# Patient Record
Sex: FEMALE | Race: WHITE | NOT HISPANIC OR LATINO | Employment: OTHER | URBAN - METROPOLITAN AREA
[De-identification: names, ages, dates, MRNs, and addresses within clinical notes are randomized per-mention and may not be internally consistent; named-entity substitution may affect disease eponyms.]

---

## 2019-03-28 ENCOUNTER — HISTORICAL (OUTPATIENT)
Dept: ADMINISTRATIVE | Facility: HOSPITAL | Age: 59
End: 2019-03-28

## 2019-03-28 LAB
ABS NEUT (OLG): 3.63 X10(3)/MCL (ref 2.1–9.2)
ALBUMIN SERPL-MCNC: 4.2 GM/DL (ref 3.4–5)
ALBUMIN/GLOB SERPL: 1.4 {RATIO}
ALP SERPL-CCNC: 106 UNIT/L (ref 38–126)
ALT SERPL-CCNC: 38 UNIT/L (ref 12–78)
APPEARANCE, UA: CLEAR
AST SERPL-CCNC: 18 UNIT/L (ref 15–37)
BACTERIA SPEC CULT: ABNORMAL /HPF
BASOPHILS # BLD AUTO: 0 X10(3)/MCL (ref 0–0.2)
BASOPHILS NFR BLD AUTO: 0 %
BILIRUB SERPL-MCNC: 0.9 MG/DL (ref 0.2–1)
BILIRUB UR QL STRIP: NEGATIVE
BILIRUBIN DIRECT+TOT PNL SERPL-MCNC: 0.2 MG/DL (ref 0–0.2)
BILIRUBIN DIRECT+TOT PNL SERPL-MCNC: 0.7 MG/DL (ref 0–0.8)
BUN SERPL-MCNC: 20 MG/DL (ref 7–18)
CALCIUM SERPL-MCNC: 9.4 MG/DL (ref 8.5–10.1)
CHLORIDE SERPL-SCNC: 104 MMOL/L (ref 98–107)
CHOLEST SERPL-MCNC: 139 MG/DL (ref 0–200)
CHOLEST/HDLC SERPL: 3 {RATIO} (ref 0–4)
CO2 SERPL-SCNC: 31 MMOL/L (ref 21–32)
COLOR UR: YELLOW
CREAT SERPL-MCNC: 0.86 MG/DL (ref 0.55–1.02)
EOSINOPHIL # BLD AUTO: 0.1 X10(3)/MCL (ref 0–0.9)
EOSINOPHIL NFR BLD AUTO: 2 %
ERYTHROCYTE [DISTWIDTH] IN BLOOD BY AUTOMATED COUNT: 12.9 % (ref 11.5–17)
GLOBULIN SER-MCNC: 2.9 GM/DL (ref 2.4–3.5)
GLUCOSE (UA): NEGATIVE
GLUCOSE SERPL-MCNC: 114 MG/DL (ref 74–106)
HCT VFR BLD AUTO: 45 % (ref 37–47)
HDLC SERPL-MCNC: 46 MG/DL (ref 35–60)
HGB BLD-MCNC: 14.6 GM/DL (ref 12–16)
HGB UR QL STRIP: NEGATIVE
KETONES UR QL STRIP: NEGATIVE
LDLC SERPL CALC-MCNC: 74 MG/DL (ref 0–129)
LEUKOCYTE ESTERASE UR QL STRIP: NEGATIVE
LYMPHOCYTES # BLD AUTO: 2.5 X10(3)/MCL (ref 0.6–4.6)
LYMPHOCYTES NFR BLD AUTO: 37 %
MCH RBC QN AUTO: 30.9 PG (ref 27–31)
MCHC RBC AUTO-ENTMCNC: 32.4 GM/DL (ref 33–36)
MCV RBC AUTO: 95.1 FL (ref 80–94)
MONOCYTES # BLD AUTO: 0.4 X10(3)/MCL (ref 0.1–1.3)
MONOCYTES NFR BLD AUTO: 7 %
NEUTROPHILS # BLD AUTO: 3.63 X10(3)/MCL (ref 2.1–9.2)
NEUTROPHILS NFR BLD AUTO: 54 %
NITRITE UR QL STRIP: NEGATIVE
PH UR STRIP: 5 [PH] (ref 5–9)
PLATELET # BLD AUTO: 317 X10(3)/MCL (ref 130–400)
PMV BLD AUTO: 9 FL (ref 9.4–12.4)
POTASSIUM SERPL-SCNC: 4.2 MMOL/L (ref 3.5–5.1)
PROT SERPL-MCNC: 7.1 GM/DL (ref 6.4–8.2)
PROT UR QL STRIP: NEGATIVE
RBC # BLD AUTO: 4.73 X10(6)/MCL (ref 4.2–5.4)
RBC #/AREA URNS HPF: 5 /HPF (ref 0–2)
SODIUM SERPL-SCNC: 141 MMOL/L (ref 136–145)
SP GR UR STRIP: 1.02 (ref 1–1.03)
SQUAMOUS EPITHELIAL, UA: ABNORMAL
TRIGL SERPL-MCNC: 96 MG/DL (ref 30–150)
TSH SERPL-ACNC: 3.18 MIU/L (ref 0.36–3.74)
UROBILINOGEN UR STRIP-ACNC: 0.2
VLDLC SERPL CALC-MCNC: 19 MG/DL
WBC # SPEC AUTO: 6.7 X10(3)/MCL (ref 4.5–11.5)
WBC #/AREA URNS HPF: ABNORMAL /[HPF]

## 2019-05-13 ENCOUNTER — HISTORICAL (OUTPATIENT)
Dept: URGENT CARE | Facility: CLINIC | Age: 59
End: 2019-05-13

## 2019-05-13 LAB
BILIRUB SERPL-MCNC: NORMAL MG/DL
BLOOD URINE, POC: NORMAL
CLARITY, POC UA: CLEAR
COLOR, POC UA: NORMAL
GLUCOSE UR QL STRIP: NEGATIVE
KETONES UR QL STRIP: NEGATIVE
LEUKOCYTE EST, POC UA: NORMAL
NITRITE, POC UA: POSITIVE
PH, POC UA: 5
PROTEIN, POC: NORMAL
SPECIFIC GRAVITY, POC UA: 1.01
UROBILINOGEN, POC UA: NORMAL

## 2019-07-18 LAB
PAP RECOMMENDATION EXT: NORMAL
PAP SMEAR: NORMAL

## 2021-09-22 ENCOUNTER — HISTORICAL (OUTPATIENT)
Dept: ADMINISTRATIVE | Facility: HOSPITAL | Age: 61
End: 2021-09-22

## 2021-09-22 LAB
ABS NEUT (OLG): 3.42 X10(3)/MCL (ref 2.1–9.2)
ALBUMIN SERPL-MCNC: 4.3 GM/DL (ref 3.4–4.8)
ALBUMIN/GLOB SERPL: 1.7 RATIO (ref 1.1–2)
ALP SERPL-CCNC: 106 UNIT/L (ref 40–150)
ALT SERPL-CCNC: 30 UNIT/L (ref 0–55)
APPEARANCE, UA: CLEAR
AST SERPL-CCNC: 24 UNIT/L (ref 5–34)
BACTERIA SPEC CULT: ABNORMAL /HPF
BASOPHILS # BLD AUTO: 0 X10(3)/MCL (ref 0–0.2)
BASOPHILS NFR BLD AUTO: 0 %
BILIRUB SERPL-MCNC: 0.6 MG/DL
BILIRUB UR QL STRIP: NEGATIVE
BILIRUBIN DIRECT+TOT PNL SERPL-MCNC: 0.3 MG/DL (ref 0–0.5)
BILIRUBIN DIRECT+TOT PNL SERPL-MCNC: 0.3 MG/DL (ref 0–0.8)
BUN SERPL-MCNC: 12 MG/DL (ref 9.8–20.1)
CALCIUM SERPL-MCNC: 10.1 MG/DL (ref 8.4–10.2)
CHLORIDE SERPL-SCNC: 104 MMOL/L (ref 98–107)
CHOLEST SERPL-MCNC: 128 MG/DL
CHOLEST/HDLC SERPL: 3 {RATIO} (ref 0–5)
CO2 SERPL-SCNC: 28 MMOL/L (ref 23–31)
COLOR UR: YELLOW
CREAT SERPL-MCNC: 0.81 MG/DL (ref 0.55–1.02)
EOSINOPHIL # BLD AUTO: 0.1 X10(3)/MCL (ref 0–0.9)
EOSINOPHIL NFR BLD AUTO: 2 %
ERYTHROCYTE [DISTWIDTH] IN BLOOD BY AUTOMATED COUNT: 13.2 % (ref 11.5–17)
EST. AVERAGE GLUCOSE BLD GHB EST-MCNC: 108.3 MG/DL
GLOBULIN SER-MCNC: 2.5 GM/DL (ref 2.4–3.5)
GLUCOSE (UA): NEGATIVE
GLUCOSE SERPL-MCNC: 112 MG/DL (ref 82–115)
HBA1C MFR BLD: 5.4 %
HCT VFR BLD AUTO: 44.1 % (ref 37–47)
HDLC SERPL-MCNC: 42 MG/DL (ref 35–60)
HGB BLD-MCNC: 14.2 GM/DL (ref 12–16)
HGB UR QL STRIP: ABNORMAL
KETONES UR QL STRIP: NEGATIVE
LDLC SERPL CALC-MCNC: 69 MG/DL (ref 50–140)
LEUKOCYTE ESTERASE UR QL STRIP: ABNORMAL
LYMPHOCYTES # BLD AUTO: 1.9 X10(3)/MCL (ref 0.6–4.6)
LYMPHOCYTES NFR BLD AUTO: 32 %
MCH RBC QN AUTO: 30.5 PG (ref 27–31)
MCHC RBC AUTO-ENTMCNC: 32.2 GM/DL (ref 33–36)
MCV RBC AUTO: 94.8 FL (ref 80–94)
MONOCYTES # BLD AUTO: 0.3 X10(3)/MCL (ref 0.1–1.3)
MONOCYTES NFR BLD AUTO: 6 %
NEUTROPHILS # BLD AUTO: 3.42 X10(3)/MCL (ref 2.1–9.2)
NEUTROPHILS NFR BLD AUTO: 59 %
NITRITE UR QL STRIP: NEGATIVE
PH UR STRIP: 5.5 [PH] (ref 5–9)
PLATELET # BLD AUTO: 291 X10(3)/MCL (ref 130–400)
PMV BLD AUTO: 9.4 FL (ref 9.4–12.4)
POTASSIUM SERPL-SCNC: 4.3 MMOL/L (ref 3.5–5.1)
PROT SERPL-MCNC: 6.8 GM/DL (ref 5.8–7.6)
PROT UR QL STRIP: NEGATIVE
RBC # BLD AUTO: 4.65 X10(6)/MCL (ref 4.2–5.4)
RBC #/AREA URNS HPF: ABNORMAL /[HPF]
SODIUM SERPL-SCNC: 142 MMOL/L (ref 136–145)
SP GR UR STRIP: 1.02 (ref 1–1.03)
SQUAMOUS EPITHELIAL, UA: ABNORMAL /HPF (ref 0–4)
TRIGL SERPL-MCNC: 83 MG/DL (ref 37–140)
TSH SERPL-ACNC: 1.5 UIU/ML (ref 0.35–4.94)
UROBILINOGEN UR STRIP-ACNC: 0.2
VLDLC SERPL CALC-MCNC: 17 MG/DL
WBC # SPEC AUTO: 5.8 X10(3)/MCL (ref 4.5–11.5)
WBC #/AREA URNS HPF: ABNORMAL /[HPF]

## 2022-04-10 ENCOUNTER — HISTORICAL (OUTPATIENT)
Dept: ADMINISTRATIVE | Facility: HOSPITAL | Age: 62
End: 2022-04-10

## 2022-04-26 VITALS
BODY MASS INDEX: 37.92 KG/M2 | OXYGEN SATURATION: 98 % | WEIGHT: 214 LBS | HEIGHT: 63 IN | SYSTOLIC BLOOD PRESSURE: 132 MMHG | DIASTOLIC BLOOD PRESSURE: 68 MMHG

## 2022-07-18 LAB — BCS RECOMMENDATION EXT: NORMAL

## 2022-09-21 ENCOUNTER — HISTORICAL (OUTPATIENT)
Dept: ADMINISTRATIVE | Facility: HOSPITAL | Age: 62
End: 2022-09-21
Payer: COMMERCIAL

## 2022-09-27 ENCOUNTER — TELEPHONE (OUTPATIENT)
Dept: INTERNAL MEDICINE | Facility: CLINIC | Age: 62
End: 2022-09-27
Payer: COMMERCIAL

## 2022-09-27 DIAGNOSIS — Z12.5 SCREENING PSA (PROSTATE SPECIFIC ANTIGEN): ICD-10-CM

## 2022-09-27 DIAGNOSIS — I10 HYPERTENSION, UNSPECIFIED TYPE: ICD-10-CM

## 2022-09-27 DIAGNOSIS — Z00.00 WELLNESS EXAMINATION: Primary | ICD-10-CM

## 2022-09-27 DIAGNOSIS — C64.9 MALIGNANT NEOPLASM OF KIDNEY, UNSPECIFIED LATERALITY: ICD-10-CM

## 2022-09-27 DIAGNOSIS — R73.01 IFG (IMPAIRED FASTING GLUCOSE): ICD-10-CM

## 2022-09-27 DIAGNOSIS — R53.83 FATIGUE, UNSPECIFIED TYPE: ICD-10-CM

## 2022-09-27 NOTE — TELEPHONE ENCOUNTER
----- Message from Paloma Arce Patient Care Assistant sent at 9/27/2022  3:53 PM CDT -----  Regarding: RE: jesus flowers 10/4 @ 2:40  Pt. Confirmed   ----- Message -----  From: Hernandez Alejandro LPN  Sent: 9/27/2022  10:20 AM CDT  To: Paloma Arce Patient Care Assistant  Subject: jesus flowers 10/4 @ 2:40                          Are there any outstanding tasks in patient chart? Needs fasting labs    Is there documentation of outstanding tasks in patient chart? no    Has patient been to the ER, urgent care, or another physician since last visit?    Has patient done any blood work or x-rays since last visit?

## 2022-10-03 ENCOUNTER — LAB VISIT (OUTPATIENT)
Dept: LAB | Facility: HOSPITAL | Age: 62
End: 2022-10-03
Attending: INTERNAL MEDICINE
Payer: COMMERCIAL

## 2022-10-03 DIAGNOSIS — C64.9 MALIGNANT NEOPLASM OF KIDNEY, UNSPECIFIED LATERALITY: ICD-10-CM

## 2022-10-03 DIAGNOSIS — Z00.00 WELLNESS EXAMINATION: ICD-10-CM

## 2022-10-03 DIAGNOSIS — I10 HYPERTENSION, UNSPECIFIED TYPE: ICD-10-CM

## 2022-10-03 DIAGNOSIS — R53.83 FATIGUE, UNSPECIFIED TYPE: ICD-10-CM

## 2022-10-03 DIAGNOSIS — R73.01 IFG (IMPAIRED FASTING GLUCOSE): ICD-10-CM

## 2022-10-03 DIAGNOSIS — Z12.5 SCREENING PSA (PROSTATE SPECIFIC ANTIGEN): ICD-10-CM

## 2022-10-03 LAB
ALBUMIN SERPL-MCNC: 4.2 GM/DL (ref 3.4–4.8)
ALBUMIN/GLOB SERPL: 1.7 RATIO (ref 1.1–2)
ALP SERPL-CCNC: 101 UNIT/L (ref 40–150)
ALT SERPL-CCNC: 32 UNIT/L (ref 0–55)
APPEARANCE UR: CLEAR
AST SERPL-CCNC: 23 UNIT/L (ref 5–34)
BACTERIA #/AREA URNS AUTO: ABNORMAL /HPF
BASOPHILS # BLD AUTO: 0.02 X10(3)/MCL (ref 0–0.2)
BASOPHILS NFR BLD AUTO: 0.3 %
BILIRUB UR QL STRIP.AUTO: NEGATIVE MG/DL
BILIRUBIN DIRECT+TOT PNL SERPL-MCNC: 0.9 MG/DL
BUN SERPL-MCNC: 14.6 MG/DL (ref 9.8–20.1)
CALCIUM SERPL-MCNC: 9.7 MG/DL (ref 8.4–10.2)
CHLORIDE SERPL-SCNC: 109 MMOL/L (ref 98–107)
CHOLEST SERPL-MCNC: 132 MG/DL
CHOLEST/HDLC SERPL: 3 {RATIO} (ref 0–5)
CO2 SERPL-SCNC: 25 MMOL/L (ref 23–31)
COLOR UR AUTO: ABNORMAL
CREAT SERPL-MCNC: 0.67 MG/DL (ref 0.55–1.02)
EOSINOPHIL # BLD AUTO: 0.12 X10(3)/MCL (ref 0–0.9)
EOSINOPHIL NFR BLD AUTO: 2 %
ERYTHROCYTE [DISTWIDTH] IN BLOOD BY AUTOMATED COUNT: 13.7 % (ref 11.5–17)
EST. AVERAGE GLUCOSE BLD GHB EST-MCNC: 111.2 MG/DL
GFR SERPLBLD CREATININE-BSD FMLA CKD-EPI: >60 MLS/MIN/1.73/M2
GLOBULIN SER-MCNC: 2.5 GM/DL (ref 2.4–3.5)
GLUCOSE SERPL-MCNC: 113 MG/DL (ref 82–115)
GLUCOSE UR QL STRIP.AUTO: NEGATIVE MG/DL
HBA1C MFR BLD: 5.5 %
HCT VFR BLD AUTO: 45.1 % (ref 37–47)
HDLC SERPL-MCNC: 42 MG/DL (ref 35–60)
HGB BLD-MCNC: 14.8 GM/DL (ref 12–16)
IMM GRANULOCYTES # BLD AUTO: 0.02 X10(3)/MCL (ref 0–0.04)
IMM GRANULOCYTES NFR BLD AUTO: 0.3 %
KETONES UR QL STRIP.AUTO: NEGATIVE MG/DL
LDLC SERPL CALC-MCNC: 68 MG/DL (ref 50–140)
LEUKOCYTE ESTERASE UR QL STRIP.AUTO: ABNORMAL UNIT/L
LYMPHOCYTES # BLD AUTO: 2.08 X10(3)/MCL (ref 0.6–4.6)
LYMPHOCYTES NFR BLD AUTO: 35.4 %
MCH RBC QN AUTO: 30.3 PG (ref 27–31)
MCHC RBC AUTO-ENTMCNC: 32.8 MG/DL (ref 33–36)
MCV RBC AUTO: 92.4 FL (ref 80–94)
MONOCYTES # BLD AUTO: 0.31 X10(3)/MCL (ref 0.1–1.3)
MONOCYTES NFR BLD AUTO: 5.3 %
NEUTROPHILS # BLD AUTO: 3.3 X10(3)/MCL (ref 2.1–9.2)
NEUTROPHILS NFR BLD AUTO: 56.7 %
NITRITE UR QL STRIP.AUTO: NEGATIVE
NRBC BLD AUTO-RTO: 0 %
PH UR STRIP.AUTO: 5.5 [PH]
PLATELET # BLD AUTO: 282 X10(3)/MCL (ref 130–400)
PMV BLD AUTO: 9.2 FL (ref 7.4–10.4)
POTASSIUM SERPL-SCNC: 4.1 MMOL/L (ref 3.5–5.1)
PROT SERPL-MCNC: 6.7 GM/DL (ref 5.8–7.6)
PROT UR QL STRIP.AUTO: NEGATIVE MG/DL
PSA SERPL-MCNC: <0.1 NG/ML
RBC # BLD AUTO: 4.88 X10(6)/MCL (ref 4.2–5.4)
RBC #/AREA URNS AUTO: ABNORMAL /HPF
RBC UR QL AUTO: NEGATIVE UNIT/L
SODIUM SERPL-SCNC: 141 MMOL/L (ref 136–145)
SP GR UR STRIP.AUTO: 1.02 (ref 1–1.03)
SQUAMOUS #/AREA URNS AUTO: <5 /HPF
T4 FREE SERPL-MCNC: 0.84 NG/DL (ref 0.7–1.48)
TRIGL SERPL-MCNC: 112 MG/DL (ref 37–140)
TSH SERPL-ACNC: 1.52 UIU/ML (ref 0.35–4.94)
UROBILINOGEN UR STRIP-ACNC: 1 MG/DL
VLDLC SERPL CALC-MCNC: 22 MG/DL
WBC # SPEC AUTO: 5.9 X10(3)/MCL (ref 4.5–11.5)
WBC #/AREA URNS AUTO: 14 /HPF

## 2022-10-03 PROCEDURE — 84153 ASSAY OF PSA TOTAL: CPT

## 2022-10-03 PROCEDURE — 80061 LIPID PANEL: CPT | Mod: 59

## 2022-10-03 PROCEDURE — 80053 COMPREHEN METABOLIC PANEL: CPT | Mod: 59

## 2022-10-03 PROCEDURE — 83036 HEMOGLOBIN GLYCOSYLATED A1C: CPT

## 2022-10-03 PROCEDURE — 84439 ASSAY OF FREE THYROXINE: CPT

## 2022-10-03 PROCEDURE — 87077 CULTURE AEROBIC IDENTIFY: CPT

## 2022-10-03 PROCEDURE — 85025 COMPLETE CBC W/AUTO DIFF WBC: CPT | Mod: 59

## 2022-10-03 PROCEDURE — 36415 COLL VENOUS BLD VENIPUNCTURE: CPT

## 2022-10-03 PROCEDURE — 81001 URINALYSIS AUTO W/SCOPE: CPT

## 2022-10-03 PROCEDURE — 84443 ASSAY THYROID STIM HORMONE: CPT

## 2022-10-04 ENCOUNTER — OFFICE VISIT (OUTPATIENT)
Dept: INTERNAL MEDICINE | Facility: CLINIC | Age: 62
End: 2022-10-04
Payer: COMMERCIAL

## 2022-10-04 VITALS
OXYGEN SATURATION: 98 % | RESPIRATION RATE: 18 BRPM | BODY MASS INDEX: 39.56 KG/M2 | HEIGHT: 62 IN | SYSTOLIC BLOOD PRESSURE: 132 MMHG | DIASTOLIC BLOOD PRESSURE: 78 MMHG | HEART RATE: 80 BPM | WEIGHT: 215 LBS

## 2022-10-04 DIAGNOSIS — H43.11 VITREOUS HEMORRHAGE, RIGHT EYE: ICD-10-CM

## 2022-10-04 DIAGNOSIS — S42.92XD CLOSED FRACTURE OF LEFT SHOULDER WITH ROUTINE HEALING, SUBSEQUENT ENCOUNTER: ICD-10-CM

## 2022-10-04 DIAGNOSIS — M81.0 OSTEOPOROSIS, UNSPECIFIED OSTEOPOROSIS TYPE, UNSPECIFIED PATHOLOGICAL FRACTURE PRESENCE: ICD-10-CM

## 2022-10-04 DIAGNOSIS — I10 HYPERTENSION, UNSPECIFIED TYPE: ICD-10-CM

## 2022-10-04 DIAGNOSIS — Z00.00 ANNUAL PHYSICAL EXAM: Primary | ICD-10-CM

## 2022-10-04 DIAGNOSIS — E66.01 SEVERE OBESITY (BMI 35.0-39.9) WITH COMORBIDITY: ICD-10-CM

## 2022-10-04 PROCEDURE — 3075F PR MOST RECENT SYSTOLIC BLOOD PRESS GE 130-139MM HG: ICD-10-PCS | Mod: CPTII,,, | Performed by: INTERNAL MEDICINE

## 2022-10-04 PROCEDURE — 1160F RVW MEDS BY RX/DR IN RCRD: CPT | Mod: CPTII,,, | Performed by: INTERNAL MEDICINE

## 2022-10-04 PROCEDURE — 3078F DIAST BP <80 MM HG: CPT | Mod: CPTII,,, | Performed by: INTERNAL MEDICINE

## 2022-10-04 PROCEDURE — 99396 PREV VISIT EST AGE 40-64: CPT | Mod: ,,, | Performed by: INTERNAL MEDICINE

## 2022-10-04 PROCEDURE — 1159F PR MEDICATION LIST DOCUMENTED IN MEDICAL RECORD: ICD-10-PCS | Mod: CPTII,,, | Performed by: INTERNAL MEDICINE

## 2022-10-04 PROCEDURE — 3008F BODY MASS INDEX DOCD: CPT | Mod: CPTII,,, | Performed by: INTERNAL MEDICINE

## 2022-10-04 PROCEDURE — 3078F PR MOST RECENT DIASTOLIC BLOOD PRESSURE < 80 MM HG: ICD-10-PCS | Mod: CPTII,,, | Performed by: INTERNAL MEDICINE

## 2022-10-04 PROCEDURE — 3008F PR BODY MASS INDEX (BMI) DOCUMENTED: ICD-10-PCS | Mod: CPTII,,, | Performed by: INTERNAL MEDICINE

## 2022-10-04 PROCEDURE — 1160F PR REVIEW ALL MEDS BY PRESCRIBER/CLIN PHARMACIST DOCUMENTED: ICD-10-PCS | Mod: CPTII,,, | Performed by: INTERNAL MEDICINE

## 2022-10-04 PROCEDURE — 99396 PR PREVENTIVE VISIT,EST,40-64: ICD-10-PCS | Mod: ,,, | Performed by: INTERNAL MEDICINE

## 2022-10-04 PROCEDURE — 3075F SYST BP GE 130 - 139MM HG: CPT | Mod: CPTII,,, | Performed by: INTERNAL MEDICINE

## 2022-10-04 PROCEDURE — 1159F MED LIST DOCD IN RCRD: CPT | Mod: CPTII,,, | Performed by: INTERNAL MEDICINE

## 2022-10-04 RX ORDER — MAGNESIUM 200 MG
TABLET ORAL ONCE
COMMUNITY

## 2022-10-04 RX ORDER — VALSARTAN AND HYDROCHLOROTHIAZIDE 160; 12.5 MG/1; MG/1
TABLET, FILM COATED ORAL
COMMUNITY
Start: 2021-10-11 | End: 2022-10-10

## 2022-10-04 RX ORDER — LORATADINE 10 MG/1
10 TABLET ORAL DAILY
COMMUNITY

## 2022-10-04 RX ORDER — ASPIRIN 81 MG/1
81 TABLET ORAL 2 TIMES DAILY
COMMUNITY
Start: 2022-05-17

## 2022-10-04 RX ORDER — MULTIVITAMIN
1 TABLET ORAL DAILY
COMMUNITY

## 2022-10-04 NOTE — PROGRESS NOTES
Patient ID: Jordan Wallace is a 62 y.o. female.    Chief Complaint: Annual Exam (Labs 10/3)      HPI:   Patient presents here today for above reason.     Jordan is a 62-year-old female presenting today in follow-up. History of hypertension was on medications in the past but no longer on any antihypertensives. Recently had a nephrectomy for renal cell carcinoma. She was previously on Diovan and amlodipine. In combination these 2 medications cause cramping of her legs. Otherwise doing much better her back is better however she has been little more sedentary. Postoperatively she is doing well. Screening uptodate  Nneeds dexa / shoulder fracture also demineralization of bones found on ct.   Vit d/ca    The patient's Health Maintenance was reviewed and the following appears to be due at this time:   Health Maintenance Due   Topic Date Due    Hepatitis C Screening  Never done    COVID-19 Vaccine (1) Never done    HIV Screening  Never done    Colorectal Cancer Screening  Never done    Cervical Cancer Screening  2022        Past Medical History:  Past Medical History:   Diagnosis Date    HTN (hypertension)      Past Surgical History:   Procedure Laterality Date    BREAST LUMPECTOMY       SECTION      CHOLECYSTECTOMY       Review of patient's allergies indicates:   Allergen Reactions    Macrolide antibiotics Shortness Of Breath     Current Outpatient Medications on File Prior to Visit   Medication Sig Dispense Refill    loratadine (CLARITIN) 10 mg tablet Take 10 mg by mouth once daily.      magnesium 200 mg Tab Take by mouth once.      multivitamin (ONE DAILY MULTIVITAMIN) per tablet Take 1 tablet by mouth once daily.      valsartan-hydrochlorothiazide (DIOVAN-HCT) 160-12.5 mg per tablet Take by mouth.      aspirin (ECOTRIN) 81 MG EC tablet Take 81 mg by mouth 2 (two) times daily.       No current facility-administered medications on file prior to visit.     Social History     Socioeconomic History    Marital  "status:    Tobacco Use    Smoking status: Never    Smokeless tobacco: Never   Substance and Sexual Activity    Alcohol use: Never    Drug use: Never     Social Determinants of Health     Financial Resource Strain: Low Risk     Difficulty of Paying Living Expenses: Not hard at all   Food Insecurity: No Food Insecurity    Worried About Running Out of Food in the Last Year: Never true    Ran Out of Food in the Last Year: Never true   Transportation Needs: No Transportation Needs    Lack of Transportation (Medical): No    Lack of Transportation (Non-Medical): No   Stress: No Stress Concern Present    Feeling of Stress : Not at all   Housing Stability: Low Risk     Unable to Pay for Housing in the Last Year: No    Number of Places Lived in the Last Year: 1    Unstable Housing in the Last Year: No     History reviewed. No pertinent family history.    ROS:   Comprehensive review of systems was performed and is negative except as noted above    Vitals/PE:   /78 (BP Location: Left arm, Patient Position: Sitting)   Pulse 80   Resp 18   Ht 5' 2" (1.575 m)   Wt 97.5 kg (215 lb)   SpO2 98%   BMI 39.32 kg/m²   Physical Exam    General: Alert and oriented, No acute distress.   Eye: Normal conjunctiva without exudate.  HENMT: Normocephalic/AT, Normal hearing, Oral mucosa is moist and pink   Neck: No goiter visualized.   Respiratory: Lungs CTAB, Respirations are non-labored, Breath sounds are equal, Symmetrical chest wall expansion.  Cardiovascular: Normal rate, Regular rhythm, No murmur, No edema.   Gastrointestinal: Non-distended.   Genitourinary: Deferred.  Musculoskeletal: Normal ROM, Normal gait, No deformities or amputations.  Integumentary: Warm, Dry, Intact. No diaphoresis, or flushing.  Neurologic: No focal deficits, Cranial Nerves II-XII are grossly intact.   Psychiatric: Cooperative, Appropriate mood & affect, Normal judgment, Non-suicidal.    Assessment/Plan:       1. Annual physical exam    2. Severe " obesity (BMI 35.0-39.9) with comorbidity    3. Vitreous hemorrhage, right eye    4. Hypertension, unspecified type    5. Closed fracture of left shoulder with routine healing, subsequent encounter    6. Osteoporosis, unspecified osteoporosis type, unspecified pathological fracture presence       Plan:  Dexa/vit d/ca  Diet exercise wtl oss  Bp at goal  Screening uptodate  Rtc 6 mo- 1year.       Education and counseling done face to face regarding medical conditions and plan. Contact office if new symptoms develop. Should any symptoms ever significantly worsen seek emergency medical attention/go to ER. Follow up at least yearly for wellness or sooner PRN. Nurse to call patient with any results. The patient is receptive, expresses understanding and is agreeable to plan. All questions have been answered.    No follow-ups on file.

## 2022-10-04 NOTE — LETTER
AUTHORIZATION FOR RELEASE OF   CONFIDENTIAL INFORMATION    Dear Staff,    We are seeing Jordan Wallace, date of birth 1960, in the clinic at [place] [address] [facility]. MIS HERNADEZ MD is the patient's PCP. Jordan Wallace has an outstanding lab/procedure at the time we reviewed her chart. In order to help keep her health information updated, she has authorized us to request the following medical record(s):        (  )  MAMMOGRAM                                      (  )  COLONOSCOPY      ( x )  PAP SMEAR                                          (  )  OUTSIDE LAB RESULTS     (  )  DEXA SCAN                                          (  )  EYE EXAM            (  )  FOOT EXAM                                          (  )  ENTIRE RECORD     (  )  OUTSIDE IMMUNIZATIONS                 (  )  _______________         Please fax records to [facility], BRADLEY CHASTANT, MD, [phone]     If you have any questions, please contact Hernandez at [phone].           Patient Name: Jordan Wallace  : 1960  Patient Phone #: [phone]

## 2022-10-05 LAB — BACTERIA UR CULT: ABNORMAL

## 2022-12-21 ENCOUNTER — TELEPHONE (OUTPATIENT)
Dept: INTERNAL MEDICINE | Facility: CLINIC | Age: 62
End: 2022-12-21
Payer: COMMERCIAL

## 2022-12-21 DIAGNOSIS — B00.1 FEVER BLISTER: Primary | ICD-10-CM

## 2022-12-21 RX ORDER — VALACYCLOVIR HYDROCHLORIDE 1 G/1
1000 TABLET, FILM COATED ORAL 2 TIMES DAILY
Qty: 10 TABLET | Refills: 0 | Status: SHIPPED | OUTPATIENT
Start: 2022-12-21 | End: 2024-01-29 | Stop reason: SDUPTHER

## 2023-01-09 PROBLEM — Z00.00 ANNUAL PHYSICAL EXAM: Status: RESOLVED | Noted: 2022-10-04 | Resolved: 2023-01-09

## 2023-07-03 DIAGNOSIS — I10 HYPERTENSION, UNSPECIFIED TYPE: ICD-10-CM

## 2023-07-03 RX ORDER — VALSARTAN AND HYDROCHLOROTHIAZIDE 160; 12.5 MG/1; MG/1
TABLET, FILM COATED ORAL
Qty: 90 TABLET | Refills: 3 | Status: SHIPPED | OUTPATIENT
Start: 2023-07-03

## 2023-10-06 ENCOUNTER — LAB VISIT (OUTPATIENT)
Dept: LAB | Facility: HOSPITAL | Age: 63
End: 2023-10-06
Attending: INTERNAL MEDICINE
Payer: COMMERCIAL

## 2023-10-06 DIAGNOSIS — S42.92XD CLOSED FRACTURE OF LEFT SHOULDER WITH ROUTINE HEALING, SUBSEQUENT ENCOUNTER: ICD-10-CM

## 2023-10-06 DIAGNOSIS — H43.11 VITREOUS HEMORRHAGE, RIGHT EYE: ICD-10-CM

## 2023-10-06 DIAGNOSIS — I10 HYPERTENSION, UNSPECIFIED TYPE: ICD-10-CM

## 2023-10-06 DIAGNOSIS — M81.0 OSTEOPOROSIS, UNSPECIFIED OSTEOPOROSIS TYPE, UNSPECIFIED PATHOLOGICAL FRACTURE PRESENCE: ICD-10-CM

## 2023-10-06 DIAGNOSIS — E66.01 SEVERE OBESITY (BMI 35.0-39.9) WITH COMORBIDITY: ICD-10-CM

## 2023-10-06 DIAGNOSIS — Z00.00 ANNUAL PHYSICAL EXAM: ICD-10-CM

## 2023-10-06 LAB
ALBUMIN SERPL-MCNC: 4.4 G/DL (ref 3.4–4.8)
ALBUMIN/GLOB SERPL: 1.6 RATIO (ref 1.1–2)
ALP SERPL-CCNC: 121 UNIT/L (ref 40–150)
ALT SERPL-CCNC: 27 UNIT/L (ref 0–55)
APPEARANCE UR: CLEAR
AST SERPL-CCNC: 18 UNIT/L (ref 5–34)
BACTERIA #/AREA URNS AUTO: ABNORMAL /HPF
BASOPHILS # BLD AUTO: 0.03 X10(3)/MCL
BASOPHILS NFR BLD AUTO: 0.4 %
BILIRUB SERPL-MCNC: 0.9 MG/DL
BILIRUB UR QL STRIP.AUTO: NEGATIVE
BUN SERPL-MCNC: 15.1 MG/DL (ref 9.8–20.1)
CALCIUM SERPL-MCNC: 10 MG/DL (ref 8.4–10.2)
CHLORIDE SERPL-SCNC: 105 MMOL/L (ref 98–107)
CHOLEST SERPL-MCNC: 159 MG/DL
CHOLEST/HDLC SERPL: 3 {RATIO} (ref 0–5)
CO2 SERPL-SCNC: 28 MMOL/L (ref 23–31)
COLOR UR AUTO: YELLOW
CREAT SERPL-MCNC: 0.82 MG/DL (ref 0.55–1.02)
DEPRECATED CALCIDIOL+CALCIFEROL SERPL-MC: 29.5 NG/ML (ref 30–80)
EOSINOPHIL # BLD AUTO: 0.13 X10(3)/MCL (ref 0–0.9)
EOSINOPHIL NFR BLD AUTO: 1.8 %
ERYTHROCYTE [DISTWIDTH] IN BLOOD BY AUTOMATED COUNT: 13.3 % (ref 11.5–17)
GFR SERPLBLD CREATININE-BSD FMLA CKD-EPI: >60 MLS/MIN/1.73/M2
GLOBULIN SER-MCNC: 2.7 GM/DL (ref 2.4–3.5)
GLUCOSE SERPL-MCNC: 115 MG/DL (ref 82–115)
GLUCOSE UR QL STRIP.AUTO: NEGATIVE
HCT VFR BLD AUTO: 50.2 % (ref 37–47)
HDLC SERPL-MCNC: 47 MG/DL (ref 35–60)
HGB BLD-MCNC: 16.4 G/DL (ref 12–16)
IMM GRANULOCYTES # BLD AUTO: 0.02 X10(3)/MCL (ref 0–0.04)
IMM GRANULOCYTES NFR BLD AUTO: 0.3 %
KETONES UR QL STRIP.AUTO: NEGATIVE
LDLC SERPL CALC-MCNC: 90 MG/DL (ref 50–140)
LEUKOCYTE ESTERASE UR QL STRIP.AUTO: ABNORMAL
LYMPHOCYTES # BLD AUTO: 2.14 X10(3)/MCL (ref 0.6–4.6)
LYMPHOCYTES NFR BLD AUTO: 29.3 %
MAGNESIUM SERPL-MCNC: 2.1 MG/DL (ref 1.6–2.6)
MCH RBC QN AUTO: 30.5 PG (ref 27–31)
MCHC RBC AUTO-ENTMCNC: 32.7 G/DL (ref 33–36)
MCV RBC AUTO: 93.3 FL (ref 80–94)
MONOCYTES # BLD AUTO: 0.39 X10(3)/MCL (ref 0.1–1.3)
MONOCYTES NFR BLD AUTO: 5.3 %
NEUTROPHILS # BLD AUTO: 4.6 X10(3)/MCL (ref 2.1–9.2)
NEUTROPHILS NFR BLD AUTO: 62.9 %
NITRITE UR QL STRIP.AUTO: NEGATIVE
NRBC BLD AUTO-RTO: 0 %
PH UR STRIP.AUTO: 5.5 [PH]
PLATELET # BLD AUTO: 285 X10(3)/MCL (ref 130–400)
PMV BLD AUTO: 8.9 FL (ref 7.4–10.4)
POTASSIUM SERPL-SCNC: 4.5 MMOL/L (ref 3.5–5.1)
PROT SERPL-MCNC: 7.1 GM/DL (ref 5.8–7.6)
PROT UR QL STRIP.AUTO: NEGATIVE
RBC # BLD AUTO: 5.38 X10(6)/MCL (ref 4.2–5.4)
RBC #/AREA URNS AUTO: <5 /HPF
RBC UR QL AUTO: ABNORMAL
SODIUM SERPL-SCNC: 141 MMOL/L (ref 136–145)
SP GR UR STRIP.AUTO: 1.02 (ref 1–1.03)
SQUAMOUS #/AREA URNS AUTO: <5 /HPF
T4 FREE SERPL-MCNC: 0.89 NG/DL (ref 0.7–1.48)
TRIGL SERPL-MCNC: 109 MG/DL (ref 37–140)
TSH SERPL-ACNC: 2.64 UIU/ML (ref 0.35–4.94)
UROBILINOGEN UR STRIP-ACNC: 0.2
VLDLC SERPL CALC-MCNC: 22 MG/DL
WBC # SPEC AUTO: 7.31 X10(3)/MCL (ref 4.5–11.5)
WBC #/AREA URNS AUTO: 59 /HPF

## 2023-10-06 PROCEDURE — 87088 URINE BACTERIA CULTURE: CPT

## 2023-10-06 PROCEDURE — 85025 COMPLETE CBC W/AUTO DIFF WBC: CPT

## 2023-10-06 PROCEDURE — 84439 ASSAY OF FREE THYROXINE: CPT

## 2023-10-06 PROCEDURE — 80053 COMPREHEN METABOLIC PANEL: CPT

## 2023-10-06 PROCEDURE — 87186 SC STD MICRODIL/AGAR DIL: CPT

## 2023-10-06 PROCEDURE — 80061 LIPID PANEL: CPT

## 2023-10-06 PROCEDURE — 36415 COLL VENOUS BLD VENIPUNCTURE: CPT

## 2023-10-06 PROCEDURE — 83735 ASSAY OF MAGNESIUM: CPT

## 2023-10-06 PROCEDURE — 82306 VITAMIN D 25 HYDROXY: CPT

## 2023-10-06 PROCEDURE — 84443 ASSAY THYROID STIM HORMONE: CPT

## 2023-10-06 PROCEDURE — 81001 URINALYSIS AUTO W/SCOPE: CPT

## 2023-10-08 LAB — BACTERIA UR CULT: ABNORMAL

## 2023-10-09 PROBLEM — C64.1 MALIGNANT NEOPLASM OF RIGHT KIDNEY, EXCEPT RENAL PELVIS: Status: ACTIVE | Noted: 2023-10-09

## 2023-10-09 NOTE — PROGRESS NOTES
Subjective:      Patient ID: Jordan Wallace is a 63 y.o. female.    Chief Complaint: No chief complaint on file.      HPI: Patient here today for annual wellness.    Review of patient's allergies indicates:   Allergen Reactions    Macrolide antibiotics Shortness Of Breath       Review of Systems    Objective:   There were no vitals taken for this visit.  The patient's weight trend is below:   Wt Readings from Last 4 Encounters:   10/04/22 97.5 kg (215 lb)   09/28/21 97.1 kg (213 lb 15.7 oz)        Physical Exam    Assessment/Plan:   1. Well adult exam  ***    2. Primary hypertension  ***    3. Severe obesity (BMI 35.0-39.9) with comorbidity  ***    4. Age-related osteoporosis without current pathological fracture  ***    5. Malignant neoplasm of right kidney, except renal pelvis  ***    6. Vitreous hemorrhage, right eye  ***      Medication List with Changes/Refills   Current Medications    ASPIRIN (ECOTRIN) 81 MG EC TABLET    Take 81 mg by mouth 2 (two) times daily.    LORATADINE (CLARITIN) 10 MG TABLET    Take 10 mg by mouth once daily.    MAGNESIUM 200 MG TAB    Take by mouth once.    MULTIVITAMIN (ONE DAILY MULTIVITAMIN) PER TABLET    Take 1 tablet by mouth once daily.    VALACYCLOVIR (VALTREX) 1000 MG TABLET    Take 1 tablet (1,000 mg total) by mouth 2 (two) times daily. for 5 days    VALSARTAN-HYDROCHLOROTHIAZIDE (DIOVAN-HCT) 160-12.5 MG PER TABLET    TAKE 1 TABLET BY MOUTH DAILY        No follow-ups on file.    Chemistry:  Lab Results   Component Value Date     10/06/2023    K 4.5 10/06/2023    CHLORIDE 105 10/06/2023    BUN 15.1 10/06/2023    CREATININE 0.82 10/06/2023    EGFRNORACEVR >60 10/06/2023    GLUCOSE 115 10/06/2023    CALCIUM 10.0 10/06/2023    ALKPHOS 121 10/06/2023    LABPROT 7.1 10/06/2023    ALBUMIN 4.4 10/06/2023    BILIDIR 0.3 09/22/2021    IBILI 0.30 09/22/2021    AST 18 10/06/2023    ALT 27 10/06/2023    MG 2.10 10/06/2023    [id number] 29.5 (L) 10/06/2023        Lab Results    Component Value Date    HGBA1C 5.5 10/03/2022        Hematology:  Lab Results   Component Value Date    WBC 7.31 10/06/2023    HGB 16.4 (H) 10/06/2023    HCT 50.2 (H) 10/06/2023     10/06/2023       Lipid Panel:  Lab Results   Component Value Date    CHOL 159 10/06/2023    HDL 47 10/06/2023    LDL 90.00 10/06/2023    TRIG 109 10/06/2023    TOTALCHOLEST 3 10/06/2023        Urine:  Lab Results   Component Value Date    COLORUA Yellow 10/06/2023    APPEARANCEUA Clear 10/06/2023    SGUA 1.021 10/06/2023    PHUA 5.5 10/06/2023    PROTEINUA Negative 10/06/2023    GLUCOSEUA Negative 10/06/2023    KETONESUA Negative 10/06/2023    BLOODUA Trace (A) 10/06/2023    NITRITESUA Negative 10/06/2023    LEUKOCYTESUR 2+ (A) 10/06/2023    RBCUA <5 10/06/2023    WBCUA 59 (H) 10/06/2023    BACTERIA 4+ (A) 10/06/2023

## 2023-10-10 ENCOUNTER — OFFICE VISIT (OUTPATIENT)
Dept: INTERNAL MEDICINE | Facility: CLINIC | Age: 63
End: 2023-10-10
Payer: COMMERCIAL

## 2023-10-10 VITALS
HEIGHT: 62 IN | HEART RATE: 73 BPM | TEMPERATURE: 98 F | OXYGEN SATURATION: 98 % | DIASTOLIC BLOOD PRESSURE: 78 MMHG | WEIGHT: 208 LBS | BODY MASS INDEX: 38.28 KG/M2 | SYSTOLIC BLOOD PRESSURE: 138 MMHG

## 2023-10-10 DIAGNOSIS — Z00.00 WELL ADULT EXAM: Primary | ICD-10-CM

## 2023-10-10 DIAGNOSIS — C64.1 MALIGNANT NEOPLASM OF RIGHT KIDNEY, EXCEPT RENAL PELVIS: ICD-10-CM

## 2023-10-10 DIAGNOSIS — E66.01 SEVERE OBESITY (BMI 35.0-39.9) WITH COMORBIDITY: ICD-10-CM

## 2023-10-10 DIAGNOSIS — Z12.11 SCREENING FOR MALIGNANT NEOPLASM OF COLON: ICD-10-CM

## 2023-10-10 DIAGNOSIS — E55.9 VITAMIN D DEFICIENCY: ICD-10-CM

## 2023-10-10 DIAGNOSIS — Z12.31 BREAST CANCER SCREENING BY MAMMOGRAM: ICD-10-CM

## 2023-10-10 DIAGNOSIS — M81.0 AGE-RELATED OSTEOPOROSIS WITHOUT CURRENT PATHOLOGICAL FRACTURE: ICD-10-CM

## 2023-10-10 DIAGNOSIS — I10 PRIMARY HYPERTENSION: ICD-10-CM

## 2023-10-10 DIAGNOSIS — R82.71 ASYMPTOMATIC BACTERIURIA: ICD-10-CM

## 2023-10-10 DIAGNOSIS — H43.11 VITREOUS HEMORRHAGE, RIGHT EYE: ICD-10-CM

## 2023-10-10 PROCEDURE — 1159F PR MEDICATION LIST DOCUMENTED IN MEDICAL RECORD: ICD-10-PCS | Mod: CPTII,,, | Performed by: NURSE PRACTITIONER

## 2023-10-10 PROCEDURE — 3075F SYST BP GE 130 - 139MM HG: CPT | Mod: CPTII,,, | Performed by: NURSE PRACTITIONER

## 2023-10-10 PROCEDURE — 1160F RVW MEDS BY RX/DR IN RCRD: CPT | Mod: CPTII,,, | Performed by: NURSE PRACTITIONER

## 2023-10-10 PROCEDURE — 1159F MED LIST DOCD IN RCRD: CPT | Mod: CPTII,,, | Performed by: NURSE PRACTITIONER

## 2023-10-10 PROCEDURE — 99396 PREV VISIT EST AGE 40-64: CPT | Mod: ,,, | Performed by: NURSE PRACTITIONER

## 2023-10-10 PROCEDURE — 3078F PR MOST RECENT DIASTOLIC BLOOD PRESSURE < 80 MM HG: ICD-10-PCS | Mod: CPTII,,, | Performed by: NURSE PRACTITIONER

## 2023-10-10 PROCEDURE — 99396 PR PREVENTIVE VISIT,EST,40-64: ICD-10-PCS | Mod: ,,, | Performed by: NURSE PRACTITIONER

## 2023-10-10 PROCEDURE — 3078F DIAST BP <80 MM HG: CPT | Mod: CPTII,,, | Performed by: NURSE PRACTITIONER

## 2023-10-10 PROCEDURE — 1160F PR REVIEW ALL MEDS BY PRESCRIBER/CLIN PHARMACIST DOCUMENTED: ICD-10-PCS | Mod: CPTII,,, | Performed by: NURSE PRACTITIONER

## 2023-10-10 PROCEDURE — 3008F BODY MASS INDEX DOCD: CPT | Mod: CPTII,,, | Performed by: NURSE PRACTITIONER

## 2023-10-10 PROCEDURE — 3075F PR MOST RECENT SYSTOLIC BLOOD PRESS GE 130-139MM HG: ICD-10-PCS | Mod: CPTII,,, | Performed by: NURSE PRACTITIONER

## 2023-10-10 PROCEDURE — 3008F PR BODY MASS INDEX (BMI) DOCUMENTED: ICD-10-PCS | Mod: CPTII,,, | Performed by: NURSE PRACTITIONER

## 2023-10-10 RX ORDER — CEFDINIR 300 MG/1
300 CAPSULE ORAL EVERY 12 HOURS
Qty: 14 CAPSULE | Refills: 0 | Status: SHIPPED | OUTPATIENT
Start: 2023-10-10 | End: 2023-10-17

## 2023-10-10 NOTE — PROGRESS NOTES
"Subjective:      Patient ID: Jordan Wallace is a 63 y.o. female.    Chief Complaint: Annual Exam      HPI: Patient here today for annual wellness.     Would like Cologuard and [facility] order for [facility]. Declined vaccines today.    Incidentally, urine appears infected. Denies fever, chills, sweats, burning on urination, or blood in urine. She likely does not drink enough water.    Review of patient's allergies indicates:   Allergen Reactions    Macrolide antibiotics Shortness Of Breath       Review of Systems   Constitutional:  Negative for chills, diaphoresis and fever.   HENT:  Negative for congestion.    Respiratory:  Negative for chest tightness and shortness of breath.    Cardiovascular:  Negative for chest pain, palpitations and leg swelling.   Gastrointestinal:  Negative for blood in stool, diarrhea, nausea and vomiting.   Genitourinary:  Negative for dysuria and pelvic pain.   Musculoskeletal:  Positive for back pain (slight lower back pain, chronic).   Neurological:  Negative for dizziness and headaches.       Objective:   Visit Vitals  /78   Pulse 73   Temp 98.3 °F (36.8 °C) (Oral)   Ht 5' 2" (1.575 m)   Wt 94.3 kg (208 lb)   SpO2 98%   BMI 38.04 kg/m²     The patient's weight trend is below:   Wt Readings from Last 4 Encounters:   10/10/23 94.3 kg (208 lb)   10/04/22 97.5 kg (215 lb)   09/28/21 97.1 kg (213 lb 15.7 oz)        Physical Exam  Constitutional:       General: She is not in acute distress.     Appearance: Normal appearance. She is not ill-appearing.   HENT:      Head: Normocephalic and atraumatic.      Right Ear: Tympanic membrane normal.      Left Ear: Tympanic membrane normal.      Mouth/Throat:      Mouth: Mucous membranes are moist.   Eyes:      Extraocular Movements: Extraocular movements intact.      Pupils: Pupils are equal, round, and reactive to light.   Neck:      Vascular: No carotid bruit.   Cardiovascular:      Rate and Rhythm: Normal rate and regular rhythm.    "   Heart sounds: Normal heart sounds.   Pulmonary:      Effort: Pulmonary effort is normal. No respiratory distress.      Breath sounds: Normal breath sounds.   Abdominal:      General: Bowel sounds are normal. There is no distension.      Palpations: Abdomen is soft.      Tenderness: There is no abdominal tenderness. There is no right CVA tenderness or left CVA tenderness.   Musculoskeletal:         General: Tenderness present.      Cervical back: Normal range of motion and neck supple.   Lymphadenopathy:      Cervical: No cervical adenopathy.   Skin:     General: Skin is warm.   Neurological:      Mental Status: She is alert.           Assessment/Plan:   1. Well adult exam  Reviewed current labs with patient  HEALTH EDUCATION RECOMMENDATIONS:  weight control, diet and cholesterol, exercise 150 minutes per week, stress reduction, and adequate sleep 6-8 hours per night      2. Vitamin D deficiency  Add Vit D with Ca 600 IU daily    3. Asymptomatic bacteriuria  Tx with cefdinir as directed  Inc water    - cefdinir (OMNICEF) 300 MG capsule; Take 1 capsule (300 mg total) by mouth every 12 (twelve) hours. for 7 days  Dispense: 14 capsule; Refill: 0    4. Primary hypertension  HYPERTENSION RECOMMENDATIONS:  Continue current treatment regimen.  Dietary sodium restriction.  Regular aerobic exercise.  Weight loss.  Reduce stress.      5. Age-related osteoporosis without current pathological fracture  BMD done last year  Repeat in 1 year    6. Malignant neoplasm of right kidney, except renal pelvis  Follow up with specialist that is also caring for this problem.    7. Vitreous hemorrhage, right eye  Previous tx, denies issues at present  Follow up with specialist that is also caring for this problem.    8. Breast cancer screening by mammogram    - Mammo Digital Screening Bilat w/ Rony; Future  - Mammo Digital Screening Bilat w/ Rony    9. Severe obesity (BMI 35.0-39.9) with comorbidity  Healthy diet and exercise    10.  Screening for malignant neoplasm of colon    - Cologuard Screening (Multitarget Stool DNA); Future  - Cologuard Screening (Multitarget Stool DNA)    I have reviewed the notes, assessments, and/or procedures performed by Dejan Jameson, NP student, I concur with her/his documentation of Jordan Wallace.    Medication List with Changes/Refills   New Medications    CEFDINIR (OMNICEF) 300 MG CAPSULE    Take 1 capsule (300 mg total) by mouth every 12 (twelve) hours. for 7 days   Current Medications    ASPIRIN (ECOTRIN) 81 MG EC TABLET    Take 81 mg by mouth 2 (two) times daily.    LORATADINE (CLARITIN) 10 MG TABLET    Take 10 mg by mouth once daily.    MAGNESIUM 200 MG TAB    Take by mouth once.    MULTIVITAMIN (ONE DAILY MULTIVITAMIN) PER TABLET    Take 1 tablet by mouth once daily.    VALACYCLOVIR (VALTREX) 1000 MG TABLET    Take 1 tablet (1,000 mg total) by mouth 2 (two) times daily. for 5 days    VALSARTAN-HYDROCHLOROTHIAZIDE (DIOVAN-HCT) 160-12.5 MG PER TABLET    TAKE 1 TABLET BY MOUTH DAILY        Follow up in about 1 year (around 10/10/2024) for Wellness Recheck, with Dr. Field.    Chemistry:  Lab Results   Component Value Date     10/06/2023    K 4.5 10/06/2023    CHLORIDE 105 10/06/2023    BUN 15.1 10/06/2023    CREATININE 0.82 10/06/2023    EGFRNORACEVR >60 10/06/2023    GLUCOSE 115 10/06/2023    CALCIUM 10.0 10/06/2023    ALKPHOS 121 10/06/2023    LABPROT 7.1 10/06/2023    ALBUMIN 4.4 10/06/2023    BILIDIR 0.3 09/22/2021    IBILI 0.30 09/22/2021    AST 18 10/06/2023    ALT 27 10/06/2023    MG 2.10 10/06/2023    [id number] 29.5 (L) 10/06/2023        Lab Results   Component Value Date    HGBA1C 5.5 10/03/2022        Hematology:  Lab Results   Component Value Date    WBC 7.31 10/06/2023    HGB 16.4 (H) 10/06/2023    HCT 50.2 (H) 10/06/2023     10/06/2023       Lipid Panel:  Lab Results   Component Value Date    CHOL 159 10/06/2023    HDL 47 10/06/2023    LDL 90.00 10/06/2023    TRIG 109  10/06/2023    TOTALCHOLEST 3 10/06/2023        Urine:  Lab Results   Component Value Date    COLORUA Yellow 10/06/2023    APPEARANCEUA Clear 10/06/2023    SGUA 1.021 10/06/2023    PHUA 5.5 10/06/2023    PROTEINUA Negative 10/06/2023    GLUCOSEUA Negative 10/06/2023    KETONESUA Negative 10/06/2023    BLOODUA Trace (A) 10/06/2023    NITRITESUA Negative 10/06/2023    LEUKOCYTESUR 2+ (A) 10/06/2023    RBCUA <5 10/06/2023    WBCUA 59 (H) 10/06/2023    BACTERIA 4+ (A) 10/06/2023

## 2023-10-30 LAB — NONINV COLON CA DNA+OCC BLD SCRN STL QL: NEGATIVE

## 2023-10-31 ENCOUNTER — PATIENT OUTREACH (OUTPATIENT)
Dept: ADMINISTRATIVE | Facility: HOSPITAL | Age: 63
End: 2023-10-31
Payer: COMMERCIAL

## 2023-10-31 NOTE — LETTER
AUTHORIZATION FOR RELEASE OF   CONFIDENTIAL INFORMATION    Dear OPAL,    Fax: [phone]    We are seeing Jordan Wallace, date of birth 1960, in the clinic at [address]. Demetrio Field II, MD is the patient's PCP. Jordan Wallace has an outstanding lab/procedure at the time we reviewed her chart. In order to help keep her health information updated, she has authorized us to request the following medical record(s):        ( X )  MAMMOGRAM                                      (  )  COLONOSCOPY      (  )  PAP SMEAR                                          (  )  OUTSIDE LAB RESULTS     (  )  DEXA SCAN                                          (  )  EYE EXAM            (  )  FOOT EXAM                                          (  )  ENTIRE RECORD     (  )  OUTSIDE IMMUNIZATIONS                 (  )  _______________         Please fax records to [facility], Chastant, Bradley J II, MD, [phone]     If you have any question or concerns. Please call [facility] @ [phone]           Patient Name: Jordan Wallace  : 1960  Patient Phone #: [phone]

## 2023-10-31 NOTE — PROGRESS NOTES
Records Received, hyper-linked into chart at this time. The following record(s)  below were uploaded for Health Maintenance .           7/18/2019 PAP SMEAR

## 2023-11-01 ENCOUNTER — DOCUMENTATION ONLY (OUTPATIENT)
Dept: ADMINISTRATIVE | Facility: HOSPITAL | Age: 63
End: 2023-11-01
Payer: COMMERCIAL

## 2024-01-29 ENCOUNTER — TELEPHONE (OUTPATIENT)
Dept: INTERNAL MEDICINE | Facility: CLINIC | Age: 64
End: 2024-01-29
Payer: COMMERCIAL

## 2024-01-29 DIAGNOSIS — B00.1 FEVER BLISTER: ICD-10-CM

## 2024-01-29 RX ORDER — VALACYCLOVIR HYDROCHLORIDE 1 G/1
1000 TABLET, FILM COATED ORAL 2 TIMES DAILY
Qty: 10 TABLET | Refills: 1 | Status: SHIPPED | OUTPATIENT
Start: 2024-01-29 | End: 2024-02-08

## 2024-01-29 NOTE — TELEPHONE ENCOUNTER
Pt is calling to see if she can get a refill on Valcyclovir. States she gets fever blisters and wants the script to have on hand when she gets another one. Please advise    CB# [phone]

## 2024-09-05 ENCOUNTER — TELEPHONE (OUTPATIENT)
Dept: INTERNAL MEDICINE | Facility: CLINIC | Age: 64
End: 2024-09-05
Payer: COMMERCIAL

## 2024-09-05 ENCOUNTER — PATIENT OUTREACH (OUTPATIENT)
Facility: CLINIC | Age: 64
End: 2024-09-05
Payer: COMMERCIAL

## 2024-09-05 DIAGNOSIS — E66.01 SEVERE OBESITY (BMI 35.0-39.9) WITH COMORBIDITY: ICD-10-CM

## 2024-09-05 DIAGNOSIS — M81.0 AGE-RELATED OSTEOPOROSIS WITHOUT CURRENT PATHOLOGICAL FRACTURE: ICD-10-CM

## 2024-09-05 DIAGNOSIS — I10 PRIMARY HYPERTENSION: ICD-10-CM

## 2024-09-05 DIAGNOSIS — Z00.00 WELL ADULT EXAM: Primary | ICD-10-CM

## 2024-09-05 DIAGNOSIS — I10 HYPERTENSION, UNSPECIFIED TYPE: ICD-10-CM

## 2024-09-05 DIAGNOSIS — E55.9 VITAMIN D DEFICIENCY: ICD-10-CM

## 2024-09-05 DIAGNOSIS — R53.83 FATIGUE, UNSPECIFIED TYPE: ICD-10-CM

## 2024-09-05 NOTE — PROGRESS NOTES
Population Health. Out Reach. Reviewing patient's chart for quality metrics.I contacted patient re: hm. Discussed scheduled pcp appt 10/22/24 at 10:40 am, hm due and importance of health screenings. Pt reports she will be going out of town in October and wish to krista mmg and have lab orders in for her to do first week of October 2024, pt reports she will be able to attend her pcp appt 10/22/24 at 10:40 am. Pt voices understanding and appreciation.   Mailed mmg appt letter to pt.    Health Maintenance Topic(s) Outreach Outcomes & Actions Taken:    Breast Cancer Screening - Outreach Outcomes & Actions Taken  : Mammogram Screening Scheduled and pt request to scheduled mammogram [facility] for first week in October 2024, orders forward to [facility] and called [facility] and spoke to Danae and schedule mammogram 10/4/24 at 1:10 pm with 1 pm arrival time. called pt and notified of scheduled mmg with [facility] for Friday 10/4/24 at 1:10 pm with 1 pm arrival time at [facility], offered pt mail mmg appt letter to her, verified pt address and pt wish for mmg appt letter to be mailed to her as well. Pt voices understanding and appreciation.     Lab(s) - Outreach Outcomes & Actions Taken  : pt request to have lab orders in for pcp appt 10/22/24 to do first week of October 2024, message sent to pcp staff for pt request.       Additional Notes:  Pt denies sdoh barriers at this time.         Care Management, Digital Medicine, and/or Education Referrals      Pt not eligible for digital medicine.

## 2024-09-05 NOTE — TELEPHONE ENCOUNTER
----- Message from Lauren Schmidt LPN sent at 9/5/2024 12:20 PM CDT -----  Hello,   Patient outreach re: hm this am. Pt request if she can please have lab orders placed for her to get labs done first week in October 2024. Thanks for your assistance in this matter.

## 2024-09-05 NOTE — LETTER
______________________________________________________________________    August 27, 2024 9/5//2024         Dear,     Ms. Ortega,      You have an appointment scheduled at [facility] on Friday October 4, 2024 at 1:10 pm with a 1 pm arrival time.    Location:  [address] [place] [address] [address], [place] [address]  Phone: [phone]    Please give me a call with any needs in the future.    Thanks,    MANOLO Aguilar  Clinical Care Coordinator  [facility] o [phone]     [facility]

## 2024-09-05 NOTE — Clinical Note
Hello,  Patient outreach re: doreen this am. Pt request if she can please have lab orders placed for her to get labs done first week in October 2024. Thanks for your assistance in this matter.

## 2024-10-07 ENCOUNTER — TELEPHONE (OUTPATIENT)
Dept: INTERNAL MEDICINE | Facility: CLINIC | Age: 64
End: 2024-10-07
Payer: MEDICARE

## 2024-10-07 ENCOUNTER — LAB VISIT (OUTPATIENT)
Dept: LAB | Facility: HOSPITAL | Age: 64
End: 2024-10-07
Attending: INTERNAL MEDICINE
Payer: MEDICARE

## 2024-10-07 DIAGNOSIS — Z00.00 WELL ADULT EXAM: ICD-10-CM

## 2024-10-07 DIAGNOSIS — I10 HYPERTENSION, UNSPECIFIED TYPE: ICD-10-CM

## 2024-10-07 DIAGNOSIS — Z12.31 BREAST CANCER SCREENING BY MAMMOGRAM: Primary | ICD-10-CM

## 2024-10-07 DIAGNOSIS — R53.83 FATIGUE, UNSPECIFIED TYPE: ICD-10-CM

## 2024-10-07 DIAGNOSIS — E66.01 SEVERE OBESITY (BMI 35.0-39.9) WITH COMORBIDITY: ICD-10-CM

## 2024-10-07 DIAGNOSIS — I10 PRIMARY HYPERTENSION: ICD-10-CM

## 2024-10-07 DIAGNOSIS — E55.9 VITAMIN D DEFICIENCY: ICD-10-CM

## 2024-10-07 DIAGNOSIS — M81.0 AGE-RELATED OSTEOPOROSIS WITHOUT CURRENT PATHOLOGICAL FRACTURE: ICD-10-CM

## 2024-10-07 LAB
25(OH)D3+25(OH)D2 SERPL-MCNC: 29 NG/ML (ref 30–80)
ALBUMIN SERPL-MCNC: 4.2 G/DL (ref 3.4–4.8)
ALBUMIN/GLOB SERPL: 1.7 RATIO (ref 1.1–2)
ALP SERPL-CCNC: 96 UNIT/L (ref 40–150)
ALT SERPL-CCNC: 44 UNIT/L (ref 0–55)
ANION GAP SERPL CALC-SCNC: 8 MEQ/L
AST SERPL-CCNC: 23 UNIT/L (ref 5–34)
BASOPHILS # BLD AUTO: 0.03 X10(3)/MCL
BASOPHILS NFR BLD AUTO: 0.4 %
BILIRUB SERPL-MCNC: 0.8 MG/DL
BUN SERPL-MCNC: 12.5 MG/DL (ref 9.8–20.1)
CALCIUM SERPL-MCNC: 9.5 MG/DL (ref 8.4–10.2)
CHLORIDE SERPL-SCNC: 107 MMOL/L (ref 98–107)
CHOLEST SERPL-MCNC: 151 MG/DL
CHOLEST/HDLC SERPL: 4 {RATIO} (ref 0–5)
CO2 SERPL-SCNC: 24 MMOL/L (ref 23–31)
CREAT SERPL-MCNC: 0.76 MG/DL (ref 0.55–1.02)
CREAT/UREA NIT SERPL: 16
EOSINOPHIL # BLD AUTO: 0.14 X10(3)/MCL (ref 0–0.9)
EOSINOPHIL NFR BLD AUTO: 2.1 %
ERYTHROCYTE [DISTWIDTH] IN BLOOD BY AUTOMATED COUNT: 13.3 % (ref 11.5–17)
GFR SERPLBLD CREATININE-BSD FMLA CKD-EPI: >60 ML/MIN/1.73/M2
GLOBULIN SER-MCNC: 2.5 GM/DL (ref 2.4–3.5)
GLUCOSE SERPL-MCNC: 121 MG/DL (ref 82–115)
HCT VFR BLD AUTO: 47.5 % (ref 37–47)
HDLC SERPL-MCNC: 41 MG/DL (ref 35–60)
HGB BLD-MCNC: 15.7 G/DL (ref 12–16)
IMM GRANULOCYTES # BLD AUTO: 0.03 X10(3)/MCL (ref 0–0.04)
IMM GRANULOCYTES NFR BLD AUTO: 0.4 %
LDLC SERPL CALC-MCNC: 85 MG/DL (ref 50–140)
LYMPHOCYTES # BLD AUTO: 2.34 X10(3)/MCL (ref 0.6–4.6)
LYMPHOCYTES NFR BLD AUTO: 35.1 %
MCH RBC QN AUTO: 30.9 PG (ref 27–31)
MCHC RBC AUTO-ENTMCNC: 33.1 G/DL (ref 33–36)
MCV RBC AUTO: 93.5 FL (ref 80–94)
MONOCYTES # BLD AUTO: 0.4 X10(3)/MCL (ref 0.1–1.3)
MONOCYTES NFR BLD AUTO: 6 %
NEUTROPHILS # BLD AUTO: 3.73 X10(3)/MCL (ref 2.1–9.2)
NEUTROPHILS NFR BLD AUTO: 56 %
NRBC BLD AUTO-RTO: 0 %
PLATELET # BLD AUTO: 284 X10(3)/MCL (ref 130–400)
PMV BLD AUTO: 8.9 FL (ref 7.4–10.4)
POTASSIUM SERPL-SCNC: 4.3 MMOL/L (ref 3.5–5.1)
PROT SERPL-MCNC: 6.7 GM/DL (ref 5.8–7.6)
RBC # BLD AUTO: 5.08 X10(6)/MCL (ref 4.2–5.4)
SODIUM SERPL-SCNC: 139 MMOL/L (ref 136–145)
TRIGL SERPL-MCNC: 126 MG/DL (ref 37–140)
TSH SERPL-ACNC: 1.8 UIU/ML (ref 0.35–4.94)
VLDLC SERPL CALC-MCNC: 25 MG/DL
WBC # BLD AUTO: 6.67 X10(3)/MCL (ref 4.5–11.5)

## 2024-10-07 PROCEDURE — 80061 LIPID PANEL: CPT

## 2024-10-07 PROCEDURE — 85025 COMPLETE CBC W/AUTO DIFF WBC: CPT

## 2024-10-07 PROCEDURE — 80053 COMPREHEN METABOLIC PANEL: CPT

## 2024-10-07 PROCEDURE — 82306 VITAMIN D 25 HYDROXY: CPT

## 2024-10-07 PROCEDURE — 36415 COLL VENOUS BLD VENIPUNCTURE: CPT

## 2024-10-07 PROCEDURE — 84443 ASSAY THYROID STIM HORMONE: CPT

## 2024-10-07 NOTE — TELEPHONE ENCOUNTER
Mammogram orders faxed to [facility] and pt notified, voiced understanding.

## 2024-10-07 NOTE — TELEPHONE ENCOUNTER
----- Message from Flash Valet sent at 10/4/2024  1:26 PM CDT -----  .Who Called: Jordan Wallace    Caller is requesting to schedule their annual mammogram appointment. Order is not listed in Epic. Please enter order and contact patient to schedule.    Where would they like the mammogram performed? [facility] on [facility]     Preferred Method of Contact: Phone Call  Patient's Preferred Phone Number on File: [phone]   Best Call Back Number, if different:  Additional Information: Pt is calling Requesting mammogram orders please call to advise

## 2024-10-09 LAB — BCS RECOMMENDATION EXT: NORMAL

## 2024-10-10 ENCOUNTER — DOCUMENTATION ONLY (OUTPATIENT)
Dept: INTERNAL MEDICINE | Facility: CLINIC | Age: 64
End: 2024-10-10
Payer: MEDICARE

## 2024-10-14 ENCOUNTER — TELEPHONE (OUTPATIENT)
Dept: INTERNAL MEDICINE | Facility: CLINIC | Age: 64
End: 2024-10-14
Payer: MEDICARE

## 2024-10-14 NOTE — TELEPHONE ENCOUNTER
----- Message from Nurse Ng sent at 10/14/2024  7:49 AM CDT -----  Regarding: jesus flowers 10/22 @10:40  Are there any outstanding tasks in chart? No    Is there any documentation of tasks? No    Has the pt seen another physician, been to ER, UCC, or admitted to hospital since last visit?    Has the pt done blood work or imaging since last visit?     5. PLEASE HAVE PATIENT BRING MEDICATION LIST OR BOTTLES TO EVERY OFFICE VISIT

## 2024-10-22 ENCOUNTER — OFFICE VISIT (OUTPATIENT)
Dept: INTERNAL MEDICINE | Facility: CLINIC | Age: 64
End: 2024-10-22
Payer: MEDICARE

## 2024-10-22 VITALS
WEIGHT: 208 LBS | RESPIRATION RATE: 18 BRPM | OXYGEN SATURATION: 98 % | DIASTOLIC BLOOD PRESSURE: 78 MMHG | HEART RATE: 75 BPM | BODY MASS INDEX: 38.28 KG/M2 | HEIGHT: 62 IN | SYSTOLIC BLOOD PRESSURE: 132 MMHG

## 2024-10-22 DIAGNOSIS — M81.0 AGE-RELATED OSTEOPOROSIS WITHOUT CURRENT PATHOLOGICAL FRACTURE: ICD-10-CM

## 2024-10-22 DIAGNOSIS — I10 PRIMARY HYPERTENSION: Primary | ICD-10-CM

## 2024-10-22 DIAGNOSIS — C64.1 MALIGNANT NEOPLASM OF RIGHT KIDNEY, EXCEPT RENAL PELVIS: ICD-10-CM

## 2024-10-22 PROBLEM — E66.01 SEVERE OBESITY (BMI 35.0-39.9) WITH COMORBIDITY: Status: RESOLVED | Noted: 2022-10-04 | Resolved: 2024-10-22

## 2024-10-22 PROBLEM — H43.11 VITREOUS HEMORRHAGE, RIGHT EYE: Status: RESOLVED | Noted: 2022-10-04 | Resolved: 2024-10-22

## 2024-10-22 RX ORDER — METHOCARBAMOL 750 MG/1
750 TABLET, FILM COATED ORAL 4 TIMES DAILY
Qty: 30 TABLET | Refills: 1 | Status: SHIPPED | OUTPATIENT
Start: 2024-10-22

## 2024-10-22 RX ORDER — DEXAMETHASONE SODIUM PHOSPHATE 4 MG/ML
8 INJECTION, SOLUTION INTRA-ARTICULAR; INTRALESIONAL; INTRAMUSCULAR; INTRAVENOUS; SOFT TISSUE
Status: COMPLETED | OUTPATIENT
Start: 2024-10-22 | End: 2024-10-22

## 2024-10-22 RX ADMIN — DEXAMETHASONE SODIUM PHOSPHATE 8 MG: 4 INJECTION, SOLUTION INTRA-ARTICULAR; INTRALESIONAL; INTRAMUSCULAR; INTRAVENOUS; SOFT TISSUE at 11:10

## 2024-10-22 NOTE — PROGRESS NOTES
Patient ID: Jordan Wallace is a 64 y.o. female.    Chief Complaint: Medicare AWV (Labs done 10/7) and Back Pain (Mid to lower back pain after lifting on Saturday)      Patient Care Team:  Demetrio Field II, MD as PCP - General (Internal Medicine)     HPI:   Patient presents here today for above reason.     Jordan is a 64-year-old female presenting today for Medicare wellness visit.  Actually doing quite well history of multiple compression fractures of the lumbar spine after moving heavy furniture.  She did returned from a trip recently from [place] drove straight 24 hours in and was moving heavy furniture now has a back messed up again.  No radiation down the leg back is pretty intense in the mid lower back.  She does have history of fairly significant spinal stenosis underwent spinal surgery couple years ago.  Has been using muscle relaxers which do seem to help.  Otherwise rest of her age-appropriate screening is up-to-date here for follow-up.    The patient's Health Maintenance was reviewed and the following appears to be due at this time:   Health Maintenance Due   Topic Date Due    Hepatitis C Screening  Never done    COVID-19 Vaccine (1) Never done    Pneumococcal Vaccines (Age 65+) (1 of 2 - PCV) Never done    HIV Screening  Never done    TETANUS VACCINE  Never done    Shingles Vaccine (1 of 2) Never done    RSV Vaccine (Age 60+ and Pregnant patients) (1 - Risk 60-74 years 1-dose series) Never done    Influenza Vaccine (1) Never done        Past Medical History:  Past Medical History:   Diagnosis Date    HTN (hypertension)      Past Surgical History:   Procedure Laterality Date    BREAST LUMPECTOMY       SECTION      CHOLECYSTECTOMY      CLOSED TREATMENT, DISLOCATION, SHOULDER, INVOLVING HUMERAL NECK FRACTURE Left      Review of patient's allergies indicates:   Allergen Reactions    Macrolide antibiotics Shortness Of Breath     Current Outpatient Medications on File Prior to Visit   Medication  Sig Dispense Refill    loratadine (CLARITIN) 10 mg tablet Take 10 mg by mouth once daily.      magnesium 200 mg Tab Take by mouth once.      multivitamin (ONE DAILY MULTIVITAMIN) per tablet Take 1 tablet by mouth once daily.      valsartan-hydrochlorothiazide (DIOVAN-HCT) 160-12.5 mg per tablet TAKE 1 TABLET BY MOUTH DAILY 90 tablet 3    valACYclovir (VALTREX) 1000 MG tablet Take 1 tablet (1,000 mg total) by mouth 2 (two) times daily. for 10 days 10 tablet 1    [DISCONTINUED] aspirin (ECOTRIN) 81 MG EC tablet Take 81 mg by mouth 2 (two) times daily. (Patient not taking: Reported on 10/22/2024)       No current facility-administered medications on file prior to visit.     Social History     Socioeconomic History    Marital status:    Tobacco Use    Smoking status: Never    Smokeless tobacco: Never   Substance and Sexual Activity    Alcohol use: Never    Drug use: Never     Social Drivers of Health     Financial Resource Strain: Low Risk  (10/4/2022)    Overall Financial Resource Strain (CARDIA)     Difficulty of Paying Living Expenses: Not hard at all   Food Insecurity: No Food Insecurity (10/4/2022)    Hunger Vital Sign     Worried About Running Out of Food in the Last Year: Never true     Ran Out of Food in the Last Year: Never true   Transportation Needs: No Transportation Needs (10/4/2022)    PRAPARE - Transportation     Lack of Transportation (Medical): No     Lack of Transportation (Non-Medical): No   Stress: No Stress Concern Present (10/4/2022)    [employer] [place] [employer] - Occupational Stress Questionnaire     Feeling of Stress : Not at all   Housing Stability: Low Risk  (10/4/2022)    Housing Stability Vital Sign     Unable to Pay for Housing in the Last Year: No     Number of Places Lived in the Last Year: 1     Unstable Housing in the Last Year: No     No family history on file.    ROS:   Review of Systems  Constitutional: No weight gain, No fever, No chills, No fatigue.   Eyes: No  "blurring, No visual disturbances.   Ear/Nose/Mouth/Throat: No decreased hearing, No ear pain, No nasal congestion, No sore throat.   Respiratory: No shortness of breath, No cough, No wheezing.   Cardiovascular: No chest pain, No palpitations, No peripheral edema.   Gastrointestinal: No nausea, No vomiting, No diarrhea, No constipation, No abdominal pain.   Genitourinary: No dysuria, No hematuria.   Hematology/Lymphatics: No bruising tendency, No bleeding tendency, No swollen lymph glands.   Endocrine: No excessive thirst, No polyuria, No excessive hunger.   Musculoskeletal: No joint pain, No muscle pain, No decreased range of motion.   Integumentary: No rash, No pruritus.   Neurologic: No abnormal balance, No confusion, No headache.   Psychiatric: No anxiety, No depression, Not suicidal, No hallucinations.        A comprehensive HEALTH RISK ASSESSMENT was completed today. Results are summarized below:    There are NO EMOTIONAL/SOCIAL CONCERNS identified on today's screening for Social Isolation, Depression and Anxiety.    There are NO COGNITIVE FUNCTION CONCERNS identified on today's screening.  The following FUNCTIONAL AND/OR SAFETY CONCERNS were identified on today's screening for Physical Symptoms, Nutritional, Home Safety/Living Situation, Fall Risk, Activities of Daily Living, Independent Activities of Daily Living, Physical Activity,Timed Up and Go test and Whisper test::   *Patient reports she NEEDS AMBULATION ASSISTANCE. (Do you use an assistance device to easily get around?: (!) Yes)(Ambulation Assistance: Walker (wheeled))     The patient reports NO OPIOID PRESCRIPTIONS. This was confirmed through medication reconciliation and the [facility] website.    The patient is NOT A TOBACCO USER.        All Questions regarding food, transportation or housing were not answered today.     Vitals/PE:   /78 (BP Location: Left arm, Patient Position: Sitting)   Pulse 75   Resp 18   Ht 5' 2" (1.575 m)   Wt 94.3 kg (208 " lb)   SpO2 98%   BMI 38.04 kg/m²   Physical Exam    General: Alert and oriented, No acute distress.   Eye: Normal conjunctiva without exudate.  HENMT: Normocephalic/AT, Normal hearing, Oral mucosa is moist and pink   Neck: No goiter visualized.   Respiratory: Lungs CTAB, Respirations are non-labored, Breath sounds are equal, Symmetrical chest wall expansion.  Cardiovascular: Normal rate, Regular rhythm, No murmur, No edema.   Gastrointestinal: Non-distended.   Genitourinary: Deferred.  Musculoskeletal: Normal ROM, Normal gait, No deformities or amputations.  Integumentary: Warm, Dry, Intact. No diaphoresis, or flushing.  Neurologic: No focal deficits, Cranial Nerves II-XII are grossly intact.   Psychiatric: Cooperative, Appropriate mood & affect, Normal judgment, Non-suicidal.             No data to display                  10/22/2024    10:40 AM 10/10/2023    10:00 AM 10/4/2022     2:40 PM   Fall Risk Assessment - Outpatient   Mobility Status Ambulatory w/ assistance Ambulatory Ambulatory   Number of falls 0 0 1   Identified as fall risk True False False                Assessment/Plan:       1. Primary hypertension    2. Malignant neoplasm of right kidney, except renal pelvis    3. Age-related osteoporosis without current pathological fracture         Plan:  8 mg Decadron IM today  Robaxin 750 q.i.d. PRN   Stretching exercises  Continue current therapy x-ray to rule out compression fracture given her history.    She was reluctant to do MRI because of claustrophobia.  Continue current therapy    Education and counseling done face to face regarding medical conditions and plan. Contact office if new symptoms develop. Should any symptoms ever significantly worsen seek emergency medical attention/go to ER. Follow up at least yearly for wellness or sooner PRN. Nurse to call patient with any results. The patient is receptive, expresses understanding and is agreeable to plan. All questions have been answered.    No  follow-ups on file.      Medicare Annual Wellness and Personalized Prevention Plan:   Fall Risk + Home Safety + Hearing Impairment + Depression Screen + Cognitive Impairment Screen + Health Risk Assessment all reviewed.    Advance Care Planning   I attest to discussing Advance Care Planning with patient and/or family member.  Education was provided including the importance of the Health Care Power of , Advance Directives, and/or [place] documentation.  The patient expressed understanding to the importance of this information and discussion.  Length of ACP conversation in minutes:

## 2025-01-03 ENCOUNTER — OFFICE VISIT (OUTPATIENT)
Dept: URGENT CARE | Facility: CLINIC | Age: 65
End: 2025-01-03
Payer: MEDICARE

## 2025-01-03 VITALS
RESPIRATION RATE: 16 BRPM | OXYGEN SATURATION: 96 % | WEIGHT: 208 LBS | BODY MASS INDEX: 40.84 KG/M2 | HEART RATE: 92 BPM | TEMPERATURE: 98 F | DIASTOLIC BLOOD PRESSURE: 88 MMHG | SYSTOLIC BLOOD PRESSURE: 161 MMHG | HEIGHT: 60 IN

## 2025-01-03 DIAGNOSIS — J06.9 VIRAL URI: Primary | ICD-10-CM

## 2025-01-03 RX ORDER — PREDNISONE 20 MG/1
20 TABLET ORAL 2 TIMES DAILY
Qty: 10 TABLET | Refills: 0 | Status: SHIPPED | OUTPATIENT
Start: 2025-01-03 | End: 2025-01-08

## 2025-01-03 NOTE — PROGRESS NOTES
Subjective:      Patient ID: Jordan Wallace is a 64 y.o. female.    Vitals:  height is 5' (1.524 m) and weight is 94.3 kg (208 lb). Her temperature is 98.4 °F (36.9 °C). Her blood pressure is 161/88 (abnormal) and her pulse is 92. Her respiration is 16 and oxygen saturation is 96%.     Chief Complaint: Cough     Patient is a 64 y.o. female who presents to urgent care with complaints of cough, congestion, and post nasal drip X4 days.  Patient denies shortness of breath, fever, body aches, trouble breathing, or chest pain.       HENT:  Positive for postnasal drip and sinus pressure.    Respiratory:  Positive for cough.       Objective:     Physical Exam   Constitutional: She is oriented to person, place, and time. She appears well-developed. She is cooperative.  Non-toxic appearance. She does not appear ill. No distress.   HENT:   Head: Normocephalic and atraumatic.   Ears:   Right Ear: Hearing, tympanic membrane, external ear and ear canal normal.   Left Ear: Hearing, tympanic membrane, external ear and ear canal normal.   Nose: Nose normal. No mucosal edema, rhinorrhea or nasal deformity. No epistaxis. Right sinus exhibits no maxillary sinus tenderness and no frontal sinus tenderness. Left sinus exhibits no maxillary sinus tenderness and no frontal sinus tenderness.   Mouth/Throat: Uvula is midline, oropharynx is clear and moist and mucous membranes are normal. No trismus in the jaw. Normal dentition. No uvula swelling. No oropharyngeal exudate, posterior oropharyngeal edema or posterior oropharyngeal erythema.   Eyes: Conjunctivae and lids are normal. No scleral icterus.   Neck: Trachea normal and phonation normal. Neck supple. No edema present. No erythema present. No neck rigidity present.   Cardiovascular: Normal rate, regular rhythm, normal heart sounds and normal pulses.   Pulmonary/Chest: Effort normal and breath sounds normal. No respiratory distress. She has no decreased breath sounds. She has no rhonchi.    Abdominal: Normal appearance.   Musculoskeletal: Normal range of motion.         General: No deformity. Normal range of motion.   Neurological: She is alert and oriented to person, place, and time. She exhibits normal muscle tone. Coordination normal.   Skin: Skin is warm, dry, intact, not diaphoretic and not pale.   Psychiatric: Her speech is normal and behavior is normal. Judgment and thought content normal.   Nursing note and vitals reviewed.         Previous History      Review of patient's allergies indicates:   Allergen Reactions    Macrolide antibiotics Shortness Of Breath    E-mycin e Other (See Comments)     Stomach pain       Past Medical History:   Diagnosis Date    HTN (hypertension)      Current Outpatient Medications   Medication Instructions    loratadine (CLARITIN) 10 mg, Daily    magnesium 200 mg Tab Once    methocarbamoL (ROBAXIN) 750 mg, Oral, 4 times daily    multivitamin (ONE DAILY MULTIVITAMIN) per tablet 1 tablet, Daily    predniSONE (DELTASONE) 20 mg, Oral, 2 times daily    valACYclovir (VALTREX) 1,000 mg, Oral, 2 times daily    valsartan-hydrochlorothiazide (DIOVAN-HCT) 160-12.5 mg per tablet TAKE 1 TABLET BY MOUTH DAILY     Past Surgical History:   Procedure Laterality Date    BREAST LUMPECTOMY       SECTION      CHOLECYSTECTOMY      CLOSED TREATMENT, DISLOCATION, SHOULDER, INVOLVING HUMERAL NECK FRACTURE Left      No family history on file.    Social History     Tobacco Use    Smoking status: Never    Smokeless tobacco: Never   Substance Use Topics    Alcohol use: Never    Drug use: Never        Physical Exam      Vital Signs Reviewed   BP (!) 161/88 (Patient Position: Sitting)   Pulse 92   Temp 98.4 °F (36.9 °C)   Resp 16   Ht 5' (1.524 m)   Wt 94.3 kg (208 lb)   SpO2 96%   BMI 40.62 kg/m²        Procedures    Procedures     Labs     Results for orders placed or performed in visit on 10/10/24    MAMMOGRAPHY    Collection Time: 10/09/24 12:00 AM   Result Value Ref Range     BCS Recommendation External Repeat mammogram in 1 year       Assessment:     1. Viral URI        Plan:   Drink plenty of fluids.     Get plenty of rest.     Tylenol or Motrin as needed.     Go to the ER with any significant change or worsening of symptoms.     Follow up with your primary care doctor.      Viral URI  -     predniSONE (DELTASONE) 20 MG tablet; Take 1 tablet (20 mg total) by mouth 2 (two) times daily. for 5 days  Dispense: 10 tablet; Refill: 0